# Patient Record
Sex: MALE | Race: WHITE | NOT HISPANIC OR LATINO | URBAN - METROPOLITAN AREA
[De-identification: names, ages, dates, MRNs, and addresses within clinical notes are randomized per-mention and may not be internally consistent; named-entity substitution may affect disease eponyms.]

---
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
## 2019-05-20 ENCOUNTER — INPATIENT (INPATIENT)
Facility: HOSPITAL | Age: 19
LOS: 7 days | Discharge: HOME | End: 2019-05-28
Attending: PSYCHIATRY & NEUROLOGY | Admitting: PSYCHIATRY & NEUROLOGY
Payer: MEDICAID

## 2019-05-20 ENCOUNTER — TRANSCRIPTION ENCOUNTER (OUTPATIENT)
Age: 19
End: 2019-05-20

## 2019-05-20 VITALS
SYSTOLIC BLOOD PRESSURE: 126 MMHG | TEMPERATURE: 98 F | OXYGEN SATURATION: 95 % | DIASTOLIC BLOOD PRESSURE: 87 MMHG | RESPIRATION RATE: 18 BRPM | WEIGHT: 139.99 LBS | HEART RATE: 110 BPM

## 2019-05-20 DIAGNOSIS — R45.851 SUICIDAL IDEATIONS: ICD-10-CM

## 2019-05-20 DIAGNOSIS — T39.311A POISONING BY PROPIONIC ACID DERIVATIVES, ACCIDENTAL (UNINTENTIONAL), INITIAL ENCOUNTER: ICD-10-CM

## 2019-05-20 DIAGNOSIS — F63.9 IMPULSE DISORDER, UNSPECIFIED: ICD-10-CM

## 2019-05-20 DIAGNOSIS — F60.9 PERSONALITY DISORDER, UNSPECIFIED: ICD-10-CM

## 2019-05-20 DIAGNOSIS — F39 UNSPECIFIED MOOD [AFFECTIVE] DISORDER: ICD-10-CM

## 2019-05-20 LAB
ALBUMIN SERPL ELPH-MCNC: 5.1 G/DL — SIGNIFICANT CHANGE UP (ref 3.5–5.2)
ALP SERPL-CCNC: 102 U/L — SIGNIFICANT CHANGE UP (ref 30–115)
ALT FLD-CCNC: 9 U/L — LOW (ref 13–38)
ANION GAP SERPL CALC-SCNC: 16 MMOL/L — HIGH (ref 7–14)
APAP SERPL-MCNC: <5 UG/ML — LOW (ref 10–30)
AST SERPL-CCNC: 14 U/L — SIGNIFICANT CHANGE UP (ref 13–38)
BASE EXCESS BLDV CALC-SCNC: 1 MMOL/L — SIGNIFICANT CHANGE UP (ref -2–2)
BASOPHILS # BLD AUTO: 0.02 K/UL — SIGNIFICANT CHANGE UP (ref 0–0.2)
BASOPHILS NFR BLD AUTO: 0.3 % — SIGNIFICANT CHANGE UP (ref 0–1)
BILIRUB SERPL-MCNC: 0.7 MG/DL — SIGNIFICANT CHANGE UP (ref 0.2–1.2)
BUN SERPL-MCNC: 17 MG/DL — SIGNIFICANT CHANGE UP (ref 10–20)
CA-I SERPL-SCNC: 1.24 MMOL/L — SIGNIFICANT CHANGE UP (ref 1.12–1.3)
CALCIUM SERPL-MCNC: 9.9 MG/DL — SIGNIFICANT CHANGE UP (ref 8.5–10.1)
CHLORIDE SERPL-SCNC: 102 MMOL/L — SIGNIFICANT CHANGE UP (ref 98–110)
CO2 SERPL-SCNC: 23 MMOL/L — SIGNIFICANT CHANGE UP (ref 17–32)
CREAT SERPL-MCNC: 1 MG/DL — SIGNIFICANT CHANGE UP (ref 0.7–1.5)
EOSINOPHIL # BLD AUTO: 0.05 K/UL — SIGNIFICANT CHANGE UP (ref 0–0.7)
EOSINOPHIL NFR BLD AUTO: 0.6 % — SIGNIFICANT CHANGE UP (ref 0–8)
ETHANOL SERPL-MCNC: <10 MG/DL — SIGNIFICANT CHANGE UP
GAS PNL BLDV: 139 MMOL/L — SIGNIFICANT CHANGE UP (ref 136–145)
GAS PNL BLDV: SIGNIFICANT CHANGE UP
GLUCOSE SERPL-MCNC: 102 MG/DL — HIGH (ref 70–99)
HCO3 BLDV-SCNC: 27 MMOL/L — SIGNIFICANT CHANGE UP (ref 22–29)
HCT VFR BLD CALC: 46.9 % — SIGNIFICANT CHANGE UP (ref 42–52)
HCT VFR BLDA CALC: 52.9 % — HIGH (ref 34–44)
HGB BLD CALC-MCNC: 17.3 G/DL — SIGNIFICANT CHANGE UP (ref 14–18)
HGB BLD-MCNC: 16.4 G/DL — SIGNIFICANT CHANGE UP (ref 14–18)
IMM GRANULOCYTES NFR BLD AUTO: 0.5 % — HIGH (ref 0.1–0.3)
LACTATE BLDV-MCNC: 1.2 MMOL/L — SIGNIFICANT CHANGE UP (ref 0.5–1.6)
LYMPHOCYTES # BLD AUTO: 1.13 K/UL — LOW (ref 1.2–3.4)
LYMPHOCYTES # BLD AUTO: 14.5 % — LOW (ref 20.5–51.1)
MCHC RBC-ENTMCNC: 29.4 PG — SIGNIFICANT CHANGE UP (ref 27–31)
MCHC RBC-ENTMCNC: 35 G/DL — SIGNIFICANT CHANGE UP (ref 32–37)
MCV RBC AUTO: 84.1 FL — SIGNIFICANT CHANGE UP (ref 80–94)
MONOCYTES # BLD AUTO: 0.5 K/UL — SIGNIFICANT CHANGE UP (ref 0.1–0.6)
MONOCYTES NFR BLD AUTO: 6.4 % — SIGNIFICANT CHANGE UP (ref 1.7–9.3)
NEUTROPHILS # BLD AUTO: 6.03 K/UL — SIGNIFICANT CHANGE UP (ref 1.4–6.5)
NEUTROPHILS NFR BLD AUTO: 77.7 % — HIGH (ref 42.2–75.2)
NRBC # BLD: 0 /100 WBCS — SIGNIFICANT CHANGE UP (ref 0–0)
PCO2 BLDV: 45 MMHG — SIGNIFICANT CHANGE UP (ref 41–51)
PH BLDV: 7.38 — SIGNIFICANT CHANGE UP (ref 7.26–7.43)
PLATELET # BLD AUTO: 216 K/UL — SIGNIFICANT CHANGE UP (ref 130–400)
PO2 BLDV: 40 MMHG — SIGNIFICANT CHANGE UP (ref 20–40)
POTASSIUM BLDV-SCNC: 4 MMOL/L — SIGNIFICANT CHANGE UP (ref 3.3–5.6)
POTASSIUM SERPL-MCNC: 4.2 MMOL/L — SIGNIFICANT CHANGE UP (ref 3.5–5)
POTASSIUM SERPL-SCNC: 4.2 MMOL/L — SIGNIFICANT CHANGE UP (ref 3.5–5)
PROT SERPL-MCNC: 7.5 G/DL — SIGNIFICANT CHANGE UP (ref 6–8)
RBC # BLD: 5.58 M/UL — SIGNIFICANT CHANGE UP (ref 4.7–6.1)
RBC # FLD: 11.7 % — SIGNIFICANT CHANGE UP (ref 11.5–14.5)
SALICYLATES SERPL-MCNC: <0.3 MG/DL — LOW (ref 4–30)
SAO2 % BLDV: 80 % — SIGNIFICANT CHANGE UP
SODIUM SERPL-SCNC: 141 MMOL/L — SIGNIFICANT CHANGE UP (ref 135–146)
WBC # BLD: 7.77 K/UL — SIGNIFICANT CHANGE UP (ref 4.8–10.8)
WBC # FLD AUTO: 7.77 K/UL — SIGNIFICANT CHANGE UP (ref 4.8–10.8)

## 2019-05-20 PROCEDURE — 90792 PSYCH DIAG EVAL W/MED SRVCS: CPT

## 2019-05-20 PROCEDURE — 99285 EMERGENCY DEPT VISIT HI MDM: CPT

## 2019-05-20 PROCEDURE — 93010 ELECTROCARDIOGRAM REPORT: CPT

## 2019-05-20 RX ORDER — HYDROXYZINE HCL 10 MG
50 TABLET ORAL EVERY 6 HOURS
Refills: 0 | Status: DISCONTINUED | OUTPATIENT
Start: 2019-05-20 | End: 2019-05-28

## 2019-05-20 RX ORDER — HALOPERIDOL DECANOATE 100 MG/ML
2.5 INJECTION INTRAMUSCULAR EVERY 6 HOURS
Refills: 0 | Status: DISCONTINUED | OUTPATIENT
Start: 2019-05-21 | End: 2019-05-28

## 2019-05-20 RX ORDER — HYDROXYZINE HCL 10 MG
50 TABLET ORAL EVERY 6 HOURS
Refills: 0 | Status: DISCONTINUED | OUTPATIENT
Start: 2019-05-21 | End: 2019-05-28

## 2019-05-20 RX ORDER — NICOTINE POLACRILEX 2 MG
2 GUM BUCCAL
Refills: 0 | Status: DISCONTINUED | OUTPATIENT
Start: 2019-05-20 | End: 2019-05-28

## 2019-05-20 RX ADMIN — Medication 2 MILLIGRAM(S): at 20:47

## 2019-05-20 NOTE — ED BEHAVIORAL HEALTH ASSESSMENT NOTE - HPI (INCLUDE ILLNESS QUALITY, SEVERITY, DURATION, TIMING, CONTEXT, MODIFYING FACTORS, ASSOCIATED SIGNS AND SYMPTOMS)
18 y o  male, domiciled in studio apartment with his wife and 4 month old daughter, unemployed x 1 month, with past medical history of resolved pediatric heart murmur and GERD, with psychiatric history of cannabis use disorder, no past inpatient psychiatric admissions, no past suicide attempts, with hx of self injurious behavior of cutting himself, brought in by ambulance, who were called by his wife, for suicide attempt.  Psychiatry consult was placed for same.     On approach, patient is lying in bed, with 1:1 at side.  He reports that this morning he woke up to his wife packing up her and their child's belongings with 2 police officers at her side, stating that they were leaving to live with her parents.  Patient reports subsequently going to the medicine cabinet and swallowing 30 Ibuprofen pills in one sitting. Does not report any rumination, intent or plan prior to doing this. Reports "at the moment, I was really angry and did it to die and also to get back at my wife and then I don't know if from the adrenaline or regret I decided to throw it all up." Patient reports that he took a video recording of the attempt and sent it to his wife saying "see, here I am, really doing this." Reports that immediately after sending the video, he self induced vomiting "so that it wouldn't work."  Patient reports that he is happy the attempt was unsuccessful and is motivated to stay alive to watch his daughter grow up.  Patient reports that there were no acute stressors with his wife yesterday, however, reports increasing stress in the last month as he lost his job and the bills have been more insurmountable.  However, reports no reason for wife to be accompanied by police in the AM.  He states that he newsome shave a history of "anger issues, punching walls and screaming at people at the drop of a hat," however, denies any physical aggression towards wife or child.  Reports that in the last month, his mood has been "depressed, where I want to be by myself and listen and write music and just isolate" and that he has feelings of "guilt and worthlessness."  Reports that he has no thoughts of hurting or killing himself or others.  Denies any changes in appetite, sleep, energy or concentration and reports that he still enjoys his hobbies of music and video games.  Denies any symptoms consistent with ephraim, anxiety or psychosis. 18 y o  male, domiciled in studio apartment with his wife and 4 month old daughter, unemployed x 1 month, with past medical history of resolved pediatric heart murmur and GERD, with psychiatric history of cannabis use disorder, no past inpatient psychiatric admissions, no past suicide attempts, with hx of self injurious behavior of cutting himself, brought in by ambulance, who were called by his wife, for suicide attempt.  Psychiatry consult was placed for same.     On approach, patient is lying in bed, with 1:1 at side.  He reports that this morning he woke up to his wife packing up her and their child's belongings with 2 police officers at her side, stating that they were leaving to live with her parents.  Patient reports subsequently going to the medicine cabinet and swallowing 30 Ibuprofen pills in one sitting. Does not report any rumination, intent or plan prior to doing this. Reports "at the moment, I was really angry and did it to die and also to get back at my wife and then I don't know if from the adrenaline or regret I decided to throw it all up." Patient reports that he took a video recording of the attempt and sent it to his wife saying "see, here I am, really doing this." Reports that immediately after sending the video, he self induced vomiting "so that it wouldn't work."  Patient reports that he is happy the attempt was unsuccessful and is motivated to stay alive to watch his daughter grow up.  Patient reports that there were no acute stressors with his wife yesterday, however, reports increasing stress in the last month as he lost his job and the bills have been more insurmountable.  However, reports no reason for wife to be accompanied by police in the AM.  He states that he newsome shave a history of "anger issues, punching walls and screaming at people at the drop of a hat," however, denies any physical aggression towards wife or child.  Reports that in the last month, his mood has been "depressed, where I want to be by myself and listen and write music and just isolate" and that he has feelings of "guilt and worthlessness."  Reports that he has no thoughts of hurting or killing himself or others.  Denies any changes in appetite, sleep, energy or concentration and reports that he still enjoys his hobbies of music and video games.  Denies any symptoms consistent with ephraim, anxiety or psychosis.  Patient aware that he will be transferred to inpatient psychiatry unit once medically cleared and refused medication treatment for his mood symptoms at this time.     Attempts to obtain personal collateral from wife were unsuccessful. 18 y o  male, domiciled in studio apartment with his wife and 4 month old daughter, unemployed x 1 month, with past medical history of resolved pediatric heart murmur and GERD, with psychiatric history of cannabis use disorder, no past inpatient psychiatric admissions, no past suicide attempts, with hx of self injurious behavior of cutting himself, brought in by ambulance, who were called by his wife, for suicide attempt.  Psychiatry consult was placed for same.     On approach, patient is lying in bed, with 1:1 at side.  He reports that this morning he woke up to his wife packing up her and their child's belongings with 2 police officers at her side, stating that they were leaving to live with her parents.  Patient reports subsequently going to the medicine cabinet and swallowing 30 Ibuprofen pills in one sitting. Does not report any rumination, intent or plan prior to doing this. Reports "at the moment, I was really angry and did it to die and also to get back at my wife and then I don't know if from the adrenaline or regret I decided to throw it all up." Patient reports that he took a video recording of the attempt and sent it to his wife saying "see, here I am, really doing this." Reports that immediately after sending the video, he self induced vomiting "so that it wouldn't work."  Patient reports that he is happy the attempt was unsuccessful and is motivated to stay alive to watch his daughter grow up.  Patient reports that there were no acute stressors with his wife yesterday, however, reports increasing stress in the last month as he lost his job and the bills have been more insurmountable.  However, reports no reason for wife to be accompanied by police in the AM.  He states that he does have a history of "anger issues, punching walls and screaming at people at the drop of a hat," however, denies any physical aggression towards wife or child.  Reports that in the last month, his mood has been "depressed, where I want to be by myself and listen and write music and just isolate" and that he has feelings of "guilt and worthlessness."  Reports that he currently has no thoughts of hurting or killing himself or others.  Denies any changes in appetite, sleep, energy or concentration and reports that he still enjoys his hobbies of music and video games.  Denies any symptoms consistent with ephraim, anxiety or psychosis.    Attempts to obtain personal collateral from wife were unsuccessful.

## 2019-05-20 NOTE — ED BEHAVIORAL HEALTH ASSESSMENT NOTE - SUMMARY
18 y o  male, domiciled in studio apartment with his wife and 4 month old daughter, unemployed x 1 month, with past medical history of resolved pediatric heart murmur and GERD, with psychiatric history of cannabis use disorder, no past inpatient psychiatric admissions, no past suicide attempts, with hx of self injurious behavior of cutting himself, brought in by ambulance, who were called by his wife, for suicide attempt.  Psychiatry consult was placed for same.     Patient presents with suicide attempt and reported history of depressed mood and demoralization for 1 month.  Though patient is not at the moment endorsing suicidal ideations/intent/plan, his current attempt, his history of poor impulse control and low frustration tolerance (marked by self injurious behavior and physical aggression towards property) put him at imminent risk of self harm and make him appropriate for emergency inpatient psychiatric hospitalization for monitoring, safety and stabilization. Patient was offered medication for above symptoms, however, he refused.  Given patient's reports of impulsivity, recurrent self-injurious behavior, difficulty controlling anger, and provocative nature of current suicide attempt, Borderline Personality Disorder diagnosis should be explored.     Recommendations:   -admit patient under 9.39 emergency legal status   -Atarax 50mg PO PRN q6h for insomnia/anxiety  -Haldol 2.5mg PO PRN q6h and Ativan 1mg PO PRN q6h for agitation not responsive to verbal redirection

## 2019-05-20 NOTE — H&P ADULT - NSHPPHYSICALEXAM_GEN_ALL_CORE
Vital Signs Last 24 Hrs  T(C): 37.2 (05-20-19 @ 19:50)  T(F): 99 (05-20-19 @ 19:50), Max: 99 (05-20-19 @ 19:50)  HR: 110 (05-20-19 @ 12:30) (110 - 110)  BP: 126/87 (05-20-19 @ 12:30)  BP(mean): --  RR: 18 (05-20-19 @ 19:50) (18 - 18)  SpO2: 95% (05-20-19 @ 12:30) (95% - 95%)  Wt(kg): --

## 2019-05-20 NOTE — ED PROVIDER NOTE - PHYSICAL EXAMINATION
CONSTITUTIONAL: Well-developed; well-nourished; in no acute distress.   SKIN: warm, dry  HEAD: Normocephalic; atraumatic.  EYES: PERRL, EOMI, no conjunctival erythema  ENT: No nasal discharge; airway clear.  NECK: Supple; non tender.  CARD: S1, S2 normal; no murmurs, gallops, or rubs. Regular rate and rhythm.   RESP: No wheezes, rales or rhonchi.  ABD: soft ntnd, no peritoneal signs.  EXT: Normal ROM.  No clubbing, cyanosis or edema.   LYMPH: No acute cervical adenopathy.  NEURO: Alert, oriented, grossly unremarkable  PSYCH: Cooperative, appropriate.

## 2019-05-20 NOTE — ED ADULT NURSE NOTE - OBJECTIVE STATEMENT
pt presents to er with EMS, sent a video to his wife swallowing approximately 30 ibuprofen tabs, pt states he was intentionally trying to hurt himself to get back at his wife for leaving him. pt denies previous suicidal ideation and states that he threw up the pills after he swallowed them. pt denies homicidal ideation, denies auditory or visual hallucinations. pt disrobbed belongings with security

## 2019-05-20 NOTE — ED BEHAVIORAL HEALTH ASSESSMENT NOTE - SUICIDE RISK FACTORS
Hopelessness/Mood episode/Highly impulsive behavior/Substance abuse/dependence/History of abuse/trauma

## 2019-05-20 NOTE — ED BEHAVIORAL HEALTH ASSESSMENT NOTE - DESCRIPTION
Patient is in good behavioral control, complaint with blood draws and EKG, calm and cooperative with interview. 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

## 2019-05-20 NOTE — ED BEHAVIORAL HEALTH ASSESSMENT NOTE - RISK ASSESSMENT
Patient has elevated risk for self harm given current suicide attempt, substance use disorder, depressed mood, and lack of social support system and lack of outpatient follow up. Risk factors are not mitigated by future orientation , residential stability, and ability to state reasons for living and patient is thus at imminent risk for self harm and require involuntary inpatient psychiatric admission.

## 2019-05-20 NOTE — ED ADULT TRIAGE NOTE - CHIEF COMPLAINT QUOTE
pt took 30 pills of Motrin today for suicidal attempt, pt reports depression over the last 2 weeks, no psych hx. pt states " I woke up with 2  in my face and my wife is leaving me, I don't want to live anymore". pt denies abd pain , stating "I forced myself to vomit but the medication was dissolved already". 1:1 observation initiated in triage

## 2019-05-20 NOTE — ED BEHAVIORAL HEALTH ASSESSMENT NOTE - DESCRIPTION (FIRST USE, LAST USE, QUANTITY, FREQUENCY, DURATION)
reports quitting 1 month ago, with prior use of 3packs per week for 2.5 years reports starting marijuana use at 15 y o currently using 1/8th/ week "about $80" with recent switch to "CBD and THC oil pen"

## 2019-05-20 NOTE — ED BEHAVIORAL HEALTH ASSESSMENT NOTE - HYGIENE
[facility]    [address]    [facility] [address]    Phone:  [phone]    Fax:  [phone]                                       Savanna Rehman   MRN: [id number]    Department:  [facility] Emergency Department   Date of Visit:  1/4/2018           After Visit Summary Signature Page     I have received my discharge instructions, and my questions have been answered. I have discussed any challenges I see with this plan with the nurse or doctor.    ..........................................................................................................................................  Patient/Patient Representative Signature      ..........................................................................................................................................  Patient Representative Print Name and Relationship to Patient    ..................................................               ................................................  Date                                            Time    ..........................................................................................................................................  Reviewed by Signature/Title    ...................................................              ..............................................  Date                                                            Time           Fair

## 2019-05-20 NOTE — ED BEHAVIORAL HEALTH ASSESSMENT NOTE - PSYCHIATRIC ISSUES AND PLAN (INCLUDE STANDING AND PRN MEDICATION)
Mood Disorder NOS: patient refusing medication treatment; Insomnia/Anxiety: Atarax 50mg PO PRN q6h; Agitation: Haldol 2.5mg PO PRN q6h and Ativan 1mg PO PRN q6h

## 2019-05-20 NOTE — ED BEHAVIORAL HEALTH ASSESSMENT NOTE - OTHER PAST PSYCHIATRIC HISTORY (INCLUDE DETAILS REGARDING ONSET, COURSE OF ILLNESS, INPATIENT/OUTPATIENT TREATMENT)
Denies past psychiatric hx, IPP admissions, medication trials, suicide attempts; reports hx of self injurious behavior of cutting self with steak knife when he gets into an argument with girlfriend, without suicidal intent; reports hx of family therapy 4-5 years with mom and siblings
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
## 2019-05-20 NOTE — ED BEHAVIORAL HEALTH ASSESSMENT NOTE - PATIENT'S CHIEF COMPLAINT
"Last night my wife and I were watching [employer] and then this morning I woke up and she was packing all her things with two policemen next to her"

## 2019-05-20 NOTE — ED BEHAVIORAL HEALTH ASSESSMENT NOTE - LEGAL HISTORY
reports receiving summons for stealing from [facility], [place] and for marijuana possession

## 2019-05-20 NOTE — ED PROVIDER NOTE - OBJECTIVE STATEMENT
18M with no significant pmh presents due to ibuprofen overdose. PT states that he came home and saw that his wife was packing her belongings with the police present. PT became upset and ingested 30 ibuprofen pills of unknown dosage, though PT states that pills were OTC. Confirms 1 incident of self induced vomiting, nbnb. Denies abd pain, current SI or HI, but confirms history of cutting without SI.

## 2019-05-20 NOTE — ED BEHAVIORAL HEALTH ASSESSMENT NOTE - CASE SUMMARY
Mr Ferro is a 18 year old man with no history of a psychiatric illness who presented to the ED following an overdose of 30 pills of Ibuprofen. Psychiatry consult was called for the evaluation of possible suicidal ideations.   His actions appear provocative however considering his depressed mood for the past 1 month, his multiple stressors, his history of self-mutilation, patient appears to have mood dysregulation, poor impulse control, poor coping skills and poor frustrations tolerance.  At this time, patient is considered a danger to himself or others and needs inpatient psychiatric hospitalization for medication management and symptoms stabilization upon medical and toxicology clearance. Patient was offered psychotropic medication for depression however patient refused. Patient will however be started on Haldol and Ativan on an as needed basis for agitation and Atarax on an as needed basis for anxiety and insomnia. Patient will benefit from continued monitoring of renal function.

## 2019-05-20 NOTE — ED PROVIDER NOTE - PROGRESS NOTE DETAILS
Spoke with Dr. Hernández who requests acetaminophen, salicylates, alcohol, and UDS as well as EKG. Spoke with Dr. Hernández, , who requests acetaminophen, salicylates, alcohol, and UDS as well as EKG and observation. Psychiatry consult placed. ATTENDING NOTE:   19 y/o M with PMH of self harm with cutting, no prior psychiatric or medical dx, presenting with suicidal attempt taking 30 tabs of 200mg ibuprofen after learning his girlfriend was leaving him. Now feeling more regretful and less agitated per pt. Exam: NAD, NCAT, HEENT: mmm, EOMI, PERRLA, Neck: supple, nontender, nl ROM, Heart: RRR,     no murmur, Extremities 2+ b/l pulses intact. Lungs: BCTA, no signs of increased WOB, Abd: NTND, no guarding or rebound, no hernia palpated, no CVAT. MSK: chest, back, and ext nontender, nl rom, no deformity. Neuro: A&Ox3, normal strength, nl sensation throughout, normal speech. A/P: Consult toxicology, concern for renal or GI side effects of overdose falling in to moderate category. Contact psych and reassess maintain on 1 to 1. I signed pt out to Dr. Vasquez, pending adult IPP bed.

## 2019-05-20 NOTE — H&P ADULT - NSHPLABSRESULTS_GEN_ALL_CORE
16.4   7.77  )-----------( 216      ( 20 May 2019 14:10 )             46.9       05-20    141  |  102  |  17  ----------------------------<  102<H>  4.2   |  23  |  1.0    Ca    9.9      20 May 2019 14:10    TPro  7.5  /  Alb  5.1  /  TBili  0.7  /  DBili  x   /  AST  14  /  ALT  9<L>  /  AlkPhos  102  05-20                      Lactate Trend            CAPILLARY BLOOD GLUCOSE

## 2019-05-20 NOTE — PATIENT PROFILE BEHAVIORAL HEALTH - NSBHTYPATTPT_PSY_A_CORE
patient stated he swallowed motrin pills and immediately forced himself to throw up/aborted/previous attempts

## 2019-05-21 PROCEDURE — 99232 SBSQ HOSP IP/OBS MODERATE 35: CPT

## 2019-05-21 RX ADMIN — Medication 2 MILLIGRAM(S): at 19:25

## 2019-05-21 NOTE — MEDICAL STUDENT PROGRESS NOTE(EDUCATION) - NS MD HP STUD ASPLAN ASSES FT
17 yo M with Past psychiatric hx of self harm w/o currently in hospital due to attempted suicide by ibunoprofen OD.

## 2019-05-21 NOTE — MEDICAL STUDENT PROGRESS NOTE(EDUCATION) - SUBJECTIVE AND OBJECTIVE BOX
HPI:  19 yo   M domiciled in a private housing, with past psychiatric history of self harm w/o SI, presented to ED after Ibunoprofen overdose.   Pt admits it was an attempted suicide after his wife of 1.5 years left him without notifying him, recorded the video of the attempt and then pulled trigger to vomit tablets after "feeling scared." States this is his first suicide attempt.      He is currently unemployed after his workplace ([employer]) got shut down for renovation, resulting in delay in rent payment and causing strain in their marriage recently.  Pt denies signs of depressions including depressed mood, lack of interest or change of appetite. Pt denies signs of ephraim or psychosis.   Pt denies somatic symptoms such as N/V.     Past Psych Hx:  Pt admits to history of self harm by scratching himself by a dollar tree knife, most recently 2 days ago.   He states he engages in these behavior in order to diverge his attention from his anger and anxiety.   He denies any past psychiatric diagnosis, medication or hospitalization.     PMHx:   No pertinent past medical history    Medication:  haloperidol     Tablet 2.5 milliGRAM(s) Oral every 6 hours PRN  hydrOXYzine hydrochloride 50 milliGRAM(s) Oral every 6 hours PRN  hydrOXYzine hydrochloride 50 milliGRAM(s) Oral every 6 hours PRN  LORazepam     Tablet 1 milliGRAM(s) Oral once PRN  LORazepam     Tablet 1 milliGRAM(s) Oral every 6 hours PRN  nicotine  Polacrilex Gum 2 milliGRAM(s) Oral every 2 hours PRN    Substance Hx:   Pt admits to longstanding usage of marijuana, most recently 1/2 wk ago.   Pt states it is to calm his nerves and he smokes as much as he can afford to.   He also admits to occasional usage of cigarettes and Juul as a substitute for marijuana.     Social:  Highest level of education: dropped out from high school in sophomore year  family hx: Sister diagnosed with bipolar disorder, father with "frequent mood swings"      Mental Status Exam:  Appearance: NAD, well developed, good eye contact, no abnormal movements  Speech: regular rate, rhythm, volume and tone  Mood: "sad" "angry" "confused" about his wife  Affect: euthymic, full range and congruent   Thought process: linear   Thought content: no sign of delusion or hallucination   Insight: good  Judgement: good  Cognition: AAOx3

## 2019-05-21 NOTE — MEDICAL STUDENT PROGRESS NOTE(EDUCATION) - NS MD HP STUD ASPLAN PLAN FT
-Suicide ideation / attempt  f/u with collateral (wife)     -ibunoprofen overdose  Pt asymptomatic   observation

## 2019-05-21 NOTE — PROGRESS NOTE BEHAVIORAL HEALTH - SUMMARY
18 y o  male, domiciled in studio apartment with his wife and 4 month old daughter, unemployed x 1 month, with past medical history of resolved pediatric heart murmur and GERD, with psychiatric history of cannabis use disorder, no past inpatient psychiatric admissions, no past suicide attempts, with hx of self injurious behavior of cutting himself, brought in by ambulance, who were called by his wife, for suicide attempt.  Psychiatry consult was placed for same.     Patient presents with suicide attempt and reported history of depressed mood and demoralization for 1 month.  Though patient is not at the moment endorsing current suicidal ideations/intent/plan, his recent attempt, his history of poor impulse control and low frustration tolerance (marked by self injurious behavior and physical aggression towards property) put him at imminent risk of self harm and make him appropriate for emergency inpatient psychiatric hospitalization for monitoring, safety and stabilization. Patient was offered medication for above symptoms, however, he refused.  Given patient's reports of impulsivity, recurrent self-injurious behavior, difficulty controlling anger, and provocative nature of current suicide attempt, Borderline Personality Disorder diagnosis should be explored.     Mood d/o vs BPD  -Atarax 50mg PO PRN q6h for insomnia/anxiety  -Haldol 2.5mg PO PRN q6h and Ativan 1mg PO PRN q6h for agitation not responsive to verbal redirection  -crisis survival skills, pt will likely benefit from DBT outpatient 18 y o  male, domiciled in studio apartment with his wife and 4 month old daughter, unemployed x 1 month, with past medical history of resolved pediatric heart murmur and GERD, with psychiatric history of cannabis use disorder, no past inpatient psychiatric admissions, no past suicide attempts, with hx of self injurious behavior of cutting himself, brought in by ambulance, who were called by his wife, for suicide attempt.  Psychiatry consult was placed for same.     Patient presents with suicide attempt and reported history of depressed mood and demoralization for 1 month.  Though patient is not at the moment endorsing current suicidal ideations/intent/plan, his recent attempt, his history of poor impulse control and low frustration tolerance (marked by self injurious behavior and physical aggression towards property) put him at imminent risk of self harm and make him appropriate for emergency inpatient psychiatric hospitalization for monitoring, safety and stabilization. Patient was offered medication for above symptoms, however, he refused.  Given patient's reports of impulsivity, recurrent self-injurious behavior, difficulty controlling anger, and provocative nature of current suicide attempt, Borderline Personality Disorder diagnosis should be explored.     Mood d/o vs BPD  -Pt stated that he did not want to by any medications  -Atarax 50mg PO PRN q6h for insomnia/anxiety  -Haldol 2.5mg PO PRN q6h and Ativan 1mg PO PRN q6h for agitation not responsive to verbal redirection  -crisis survival skills, pt will likely benefit from DBT outpatient

## 2019-05-21 NOTE — PROGRESS NOTE BEHAVIORAL HEALTH - NSBHFUPADDHPIFT_PSY_A_CORE
19yo male, , domiciled with wife & 4mo (who left home yesterday), unemployed x1mo receiving unemployment benefits, with no pph, +cannabis use, no prior suicide attempts or IPP admissions. Patient biba for suicide attempt following episode where he consumed up to 30 ibuprofen then self-induced vomiting immediately afterwards.  patient reports that he wanted to kill himself for a split second but then forced himself to vomit because he wanted to live. This contradicts with triage note, where pt reported that he did not want to live at presentation. Ingestion was recorded and sent to wife who subsequently called 911. Patient states that he has been depressed, but still enjoys listening to music and playing video games. He also enjoys smoking cannabis with friends, but has not had money to do that. Patient reports chronic difficulty concentrating, denying loss of energy, hopelessness, or difficulty maintaining sleep. he also denies symptoms of ephraim and psychosis.    Writer unable to reach patient's wife at number provided.

## 2019-05-21 NOTE — CHART NOTE - NSCHARTNOTEFT_GEN_A_CORE
Social Work Admit Note:    Patient is 18 years of age male who was admitted for evaluation after suicide attempt.  Prior to admission patient ingested thirty of Ibuprofen pills at one time.  At time of assessment in the emergency department patient informed he took a video of this event to send to his spouse.  After sending the video he induced vomiting to abort his suicide attempt.      In the community patient resides with his spouse and four month old daughter.  He does not report stressor with spouse.  Other stressors identified as the recent loss of his job.  His place of employment has closed for renovations and he expects to return to work in September when it reopens.  Financial stressors of not being able to pay bills and keep up with the rent of his apartment endorsed.  Patient has recently been cutting.  His arm has superficial cuts.  Current use of cannabis use.       will continue to meet with patient 1:1 and with treatment team daily.  Discharge plan is for referral for continued mental health treatment in outpatient setting.      Please refer to Social Work Psychosocial for additional information.

## 2019-05-21 NOTE — PROGRESS NOTE BEHAVIORAL HEALTH - NSBHADDHXSUBSTFT_PSY_A_CORE
daily cannabis use when he can afford. $80 worth/week. quit smoking 1mo ago. uses Juuls for nicotine and cbd oil now.

## 2019-05-21 NOTE — CONSULT NOTE ADULT - SUBJECTIVE AND OBJECTIVE BOX
MARIANA GARCIA  18y  Male      Patient is a 18y old  Male who presents with a chief complaint of 18 YEARS OLD MALE COME TO ER FOR EVALUATION  AFTER SUICIDAL ATTEMPT . (20 May 2019 20:20)    HPI:  18M with no significant pmh presents due to ibuprofen overdose. PT states that he came home and saw that his wife was packing her belongings with the police present. PT became upset and ingested 30 ibuprofen pills of unknown dosage, though PT states that pills were OTC. Confirms 1 incident of self induced vomiting, nbnb. Denies abd pain, current SI or HI, but confirms history of cutting without SI. (20 May 2019 20:20)    INTERVAL HPI/OVERNIGHT EVENTS:  HEALTH ISSUES - PROBLEM Dx:  Ibuprofen overdose: Ibuprofen overdose  Suicidal ideation: Suicidal ideation  Personality disorder, unspecified  Impulse control disorder in adult  Mood disorder        PAST MEDICAL & SURGICAL HISTORY:  No pertinent past medical history    FAMILY HISTORY:    haloperidol     Tablet 2.5 milliGRAM(s) Oral every 6 hours PRN  hydrOXYzine hydrochloride 50 milliGRAM(s) Oral every 6 hours PRN  hydrOXYzine hydrochloride 50 milliGRAM(s) Oral every 6 hours PRN  LORazepam     Tablet 1 milliGRAM(s) Oral once PRN  LORazepam     Tablet 1 milliGRAM(s) Oral every 6 hours PRN  nicotine  Polacrilex Gum 2 milliGRAM(s) Oral every 2 hours PRN      REVIEW OF SYSTEMS:  CONSTITUTIONAL: No fever, weight loss, or fatigue  EYES: No eye pain, visual disturbances, or discharge  ENMT:  No difficulty hearing, tinnitus, vertigo; No sinus or throat pain  NECK: No pain or stiffness  BREASTS: No pain, masses, or nipple discharge  RESPIRATORY: No cough, wheezing, chills or hemoptysis; No shortness of breath  CARDIOVASCULAR: No chest pain, palpitations, dizziness, or leg swelling  GASTROINTESTINAL: No abdominal or epigastric pain. No nausea, vomiting, or hematemesis; No diarrhea or constipation. No melena or hematochezia.  GENITOURINARY: No dysuria, frequency, hematuria, or incontinence  NEUROLOGICAL: No headaches, memory loss, loss of strength, numbness, or tremors  SKIN: No itching, burning, rashes, or lesions   LYMPH NODES: No enlarged glands  ENDOCRINE: No heat or cold intolerance; No hair loss  MUSCULOSKELETAL: No joint pain or swelling; No muscle, back, or extremity pain  PSYCHIATRIC: as per hpi and previous psych history  HEME/LYMPH: No easy bruising, or bleeding gums  ALLERY AND IMMUNOLOGIC: No hives or eczema    T(C): 36.2 (05-21-19 @ 05:56), Max: 37.2 (05-20-19 @ 19:50)  HR: 71 (05-21-19 @ 05:56) (71 - 110)  BP: 117/81 (05-21-19 @ 05:56) (117/81 - 126/87)  RR: 18 (05-21-19 @ 05:56) (18 - 18)  SpO2: 95% (05-20-19 @ 12:30) (95% - 95%)  Wt(kg): --Vital Signs Last 24 Hrs  T(C): 36.2 (21 May 2019 05:56), Max: 37.2 (20 May 2019 19:50)  T(F): 97.2 (21 May 2019 05:56), Max: 99 (20 May 2019 19:50)  HR: 71 (21 May 2019 05:56) (71 - 110)  BP: 117/81 (21 May 2019 05:56) (117/81 - 126/87)  BP(mean): --  RR: 18 (21 May 2019 05:56) (18 - 18)  SpO2: 95% (20 May 2019 12:30) (95% - 95%)    PHYSICAL EXAM:  GENERAL: NAD,well-developed  HEAD:  Atraumatic, Normocephalic  EYES: EOMI, PERRLA, conjunctiva and sclera clear  ENMT: No tonsillar erythema, exudates, or enlargement; Moist mucous membranes, Good dentition, No lesions  NECK: Supple, No JVD, Normal thyroid  CHEST/LUNG: Clear bs bilaterally; No rales, rhonchi, wheezing  HEART: Regular rate and rhythm; No murmurs, rubs, or gallops  ABDOMEN: Soft, Nontender, Nondistended; Bowel sounds present  EXTREMITIES:  2+ Peripheral Pulses, No clubbing, cyanosis, or edema  LYMPH: No lymphadenopathy noted  SKIN: No rashes or lesions  Neuro: alert  no focal deficits    Consultant(s) Notes Reviewed:  [x ] YES  [ ] NO  Care Discussed with Consultants/Other Providers [ x] YES  [ ] NO    LABS:                        16.4   7.77  )-----------( 216      ( 20 May 2019 14:10 )             46.9     05-20    141  |  102  |  17  ----------------------------<  102<H>  4.2   |  23  |  1.0    Ca    9.9      20 May 2019 14:10    TPro  7.5  /  Alb  5.1  /  TBili  0.7  /  DBili  x   /  AST  14  /  ALT  9<L>  /  AlkPhos  102  05-20        CAPILLARY BLOOD GLUCOSE                RADIOLOGY & ADDITIONAL TESTS:    Imaging Personally Reviewed:  [ ] YES  [ ] NO

## 2019-05-21 NOTE — PROGRESS NOTE BEHAVIORAL HEALTH - NSBHADDHXPSYSOCFT_PSY_A_CORE
Patient raised in [place], mom lives in [place]. dad . dad used corporal punishment. patient dropped out of school in 10th grade, held back 5th grade and 6th grade. was in [employer] for math and english.  x1.5yr, but has been with wife for 3 yrs, no h/o domestic violence. patient was working as [employer]  until it closed for renovation - expected to return when it reopens in september.

## 2019-05-21 NOTE — PROGRESS NOTE BEHAVIORAL HEALTH - NSBHFUPINTERVALHXFT_PSY_A_CORE
No acute events overnight. patient consuming meals. reports that he has no interest in initiating medications unless he has to, saying that he would prefer therapy. He continues to endorse feeling depressed with the fact that his wife left, reporting that he does not know why she left with their child. he denies any current thoughts of wanting to die or kill himself.

## 2019-05-22 DIAGNOSIS — F43.20 ADJUSTMENT DISORDER, UNSPECIFIED: ICD-10-CM

## 2019-05-22 LAB
CHOLEST SERPL-MCNC: 144 MG/DL — SIGNIFICANT CHANGE UP (ref 82–200)
ESTIMATED AVERAGE GLUCOSE: 97 MG/DL — SIGNIFICANT CHANGE UP (ref 68–114)
HBA1C BLD-MCNC: 5 % — SIGNIFICANT CHANGE UP (ref 4–5.6)
HDLC SERPL-MCNC: 32 MG/DL — LOW
LIPID PNL WITH DIRECT LDL SERPL: 104 MG/DL — SIGNIFICANT CHANGE UP (ref 4–129)
TOTAL CHOLESTEROL/HDL RATIO MEASUREMENT: 4.5 RATIO — SIGNIFICANT CHANGE UP (ref 4–5.5)
TRIGL SERPL-MCNC: 133 MG/DL — SIGNIFICANT CHANGE UP (ref 10–149)
TSH SERPL-MCNC: 1.14 UIU/ML — SIGNIFICANT CHANGE UP (ref 0.27–4.2)

## 2019-05-22 PROCEDURE — 99232 SBSQ HOSP IP/OBS MODERATE 35: CPT

## 2019-05-22 RX ORDER — LANOLIN ALCOHOL/MO/W.PET/CERES
3 CREAM (GRAM) TOPICAL AT BEDTIME
Refills: 0 | Status: DISCONTINUED | OUTPATIENT
Start: 2019-05-22 | End: 2019-05-28

## 2019-05-22 RX ADMIN — Medication 2 MILLIGRAM(S): at 11:52

## 2019-05-22 RX ADMIN — Medication 3 MILLIGRAM(S): at 20:22

## 2019-05-22 RX ADMIN — Medication 50 MILLIGRAM(S): at 20:23

## 2019-05-22 RX ADMIN — Medication 2 MILLIGRAM(S): at 10:04

## 2019-05-22 RX ADMIN — Medication 2 MILLIGRAM(S): at 08:00

## 2019-05-22 RX ADMIN — Medication 2 MILLIGRAM(S): at 15:21

## 2019-05-22 NOTE — PROGRESS NOTE BEHAVIORAL HEALTH - SUMMARY
18 y o  male, domiciled in studio apartment with his wife and 4 month old daughter, unemployed x 1 month, with past medical history of resolved pediatric heart murmur and GERD, with psychiatric history of cannabis use disorder, no past inpatient psychiatric admissions, no past suicide attempts, with hx of self injurious behavior of cutting himself, brought in by ambulance, who were called by his wife, for suicide attempt.  Psychiatry consult was placed for same.     Patient presents with suicide attempt and reported history of depressed mood and demoralization for 1 month.  Though patient is not at the moment endorsing current suicidal ideations/intent/plan, his recent attempt, his history of poor impulse control and low frustration tolerance (marked by self injurious behavior and physical aggression towards property) put him at imminent risk of self harm and make him appropriate for emergency inpatient psychiatric hospitalization for monitoring, safety and stabilization. Patient was offered medication for above symptoms, however, he refused.  Given patient's reports of impulsivity, recurrent self-injurious behavior, difficulty controlling anger, and provocative nature of current suicide attempt, Borderline Personality Disorder diagnosis should be explored.     Mood d/o vs BPD  -Pt stated that he did not want to by any medications  - Melatonin 3 mg qhs for insomnia  -Atarax 50mg PO PRN q6h for insomnia/anxiety  -Haldol 2.5mg PO PRN q6h and Ativan 1mg PO PRN q6h for agitation not responsive to verbal redirection  -crisis survival skills, pt will likely benefit from DBT outpatient

## 2019-05-22 NOTE — MEDICAL STUDENT PROGRESS NOTE(EDUCATION) - SUBJECTIVE AND OBJECTIVE BOX
HPI:  19 yo   M domiciled in a private housing with wife and daughter (4mo), with past psychiatric history of self harm w/o SI, presented to ED after Ibunoprofen overdose.   Pt admits it was an attempted suicide after his wife of 1.5 years left him without notifying him, recorded the video of the attempt and then pulled trigger to vomit tablets after "feeling scared."   Denies prior suicide attempt.      He is currently unemployed after his workplace ([employer]) got shut down for renovation, resulting in delay in rent payment and causing strain in their marriage recently.  Pt denies signs of depressions including depressed mood, lack of interest or change of appetite. Pt denies signs of ephraim or psychosis.   Pt denies somatic symptoms such as N/V.     This morning, Pt denies acute overnight events or changes. He complains of inability to sleep and requests for melatonin.   He admits to anxiety in regard to his housing situation.   He states he has not been going to group sessions due to his lack of sleep but is willing to attend to sessions today.   Pt denies current suicide or homicidal ideation.   Pt denies any withdrawal symptoms or side effects.       Past Psych Hx:  Pt admits to history of self harm by scratching himself by a dollar tree knife, most recently 2 days ago.   He states he engages in these behavior in order to diverge his attention from his anger and anxiety.   He denies any past psychiatric diagnosis, medication or hospitalization.     PMHx:   No pertinent past medical history    Medication:  haloperidol     Tablet 2.5 milliGRAM(s) Oral every 6 hours PRN  hydrOXYzine hydrochloride 50 milliGRAM(s) Oral every 6 hours PRN  hydrOXYzine hydrochloride 50 milliGRAM(s) Oral every 6 hours PRN  LORazepam     Tablet 1 milliGRAM(s) Oral once PRN  LORazepam     Tablet 1 milliGRAM(s) Oral every 6 hours PRN  nicotine  Polacrilex Gum 2 milliGRAM(s) Oral every 2 hours PRN    Substance Hx:   Pt admits to longstanding usage of marijuana, most recently 1/2 wk ago.   Pt states it is to calm his nerves and he smokes as much as he can afford to.   He also admits to occasional usage of cigarettes and Juul as a substitute for marijuana.     Social:  Highest level of education: dropped out from high school in sophomore year  family hx: Sister diagnosed with bipolar disorder, father with "frequent mood swings"      Mental Status Exam:  Appearance: NAD, well developed, good eye contact, no abnormal movements  Speech: regular rate, rhythm, volume and tone  Mood: "alright" "it's OK"  Affect: euthymic, full range and congruent   Thought process: linear   Thought content: no sign of delusion or hallucination   Insight: good  Judgement: good  Cognition: AAOx4

## 2019-05-22 NOTE — PROGRESS NOTE BEHAVIORAL HEALTH - NSBHFUPINTERVALHXFT_PSY_A_CORE
Pt was seen, evaluated, chart reviewed.  As per nursing staff, no events overnight. On evaluation, pt reports that he was catching up on sleep yesterday and missed the groups during the day. Reports that he is doing "ok" and is regretful of his overdose attempt. Remains adamant that it was not a suicide attempt and that he did not want to die. Pt continues to not want any medication, stating that he is "not depressed." States that he needs to get out of the hospital and figure out how to keep his housing. Endorsed good appetite. Is agreeable to take melatonin for sleep. Denied A/V hallucinations. Denied paranoia. Denied suicidal/homicidal ideation, intent or plan.    Collateral was obtained from pt's mother Emilie ([phone]), with pt's permission. As per mother, she reports that pt's wife has "always threatened to leave him." She reports that as per the wife, they went to sleep and then she took off the next day and was packing her belongings. She is not sure what the pt did to instigate this situation. States that the next thing was pt was threatening to hurt himself "in order to get her back." States that afterwards he sent her the video of him taking the pills and she sent the ambulance to his house. Mother states that "the was a plot for him to get her back." States that the pt has "never been suicidal" and has never done anything like this before. She does not think that pt was actually trying to harm himself, states it was "just to get his wife to come back." Continues to report that she does not think pt is suicidal.

## 2019-05-22 NOTE — MEDICAL STUDENT PROGRESS NOTE(EDUCATION) - NS MD HP STUD ASPLAN PLAN FT
-Mood disorder / Suicide ideation / attempt  f/u with collaterals (wife, mother, cousin, grandmother"   Pt still prefers medications over therapy thinking they would not be helpful.   Can benefit from group therapy.     -ibunoprofen overdose  Pt asymptomatic   observation

## 2019-05-22 NOTE — MEDICAL STUDENT PROGRESS NOTE(EDUCATION) - NS MD HP STUD ASPLAN ASSES FT
19 yo M with Past psychiatric hx of self harm w/o currently in hospital due to attempted suicide by ibunoprofen OD.

## 2019-05-23 DIAGNOSIS — F60.9 PERSONALITY DISORDER, UNSPECIFIED: ICD-10-CM

## 2019-05-23 PROCEDURE — 99232 SBSQ HOSP IP/OBS MODERATE 35: CPT

## 2019-05-23 RX ADMIN — Medication 3 MILLIGRAM(S): at 20:20

## 2019-05-23 RX ADMIN — Medication 2 MILLIGRAM(S): at 11:00

## 2019-05-23 RX ADMIN — Medication 2 MILLIGRAM(S): at 08:10

## 2019-05-23 NOTE — MEDICAL STUDENT PROGRESS NOTE(EDUCATION) - NS MD HP STUD ASPLAN PLAN FT
-Mood disorder / Suicide ideation / attempt  f/u with collaterals (wife, mother, cousin, grandmother"   Pt still prefers medications over therapy thinking they would not be helpful.   Can benefit from group therapy including crisis survival skill sessions.     -insomnia   reports improvement per pt  Melatonin 3 mg qhs  Atarax 50mg PO PRN q6h    -ibunoprofen overdose  Pt asymptomatic   observation

## 2019-05-23 NOTE — MEDICAL STUDENT PROGRESS NOTE(EDUCATION) - SUBJECTIVE AND OBJECTIVE BOX
HPI:  17 yo   M domiciled in a private housing with wife and daughter (4mo), with past psychiatric history of self harm w/o SI, presented to ED after Ibunoprofen overdose.   Pt admits it was an attempted suicide after his wife of 1.5 years left him without notifying him, recorded the video of the attempt and then pulled trigger to vomit tablets after "feeling scared."   Denies prior suicide attempt.      He is currently unemployed after his workplace ([employer]) got shut down for renovation, resulting in delay in rent payment and causing strain in their marriage recently.  Pt denies signs of depressions including depressed mood, lack of interest or change of appetite. Pt denies signs of ephraim or psychosis.   Pt denies somatic symptoms such as N/V or headache.     This morning, Pt denies acute overnight events or changes.   He states his sleep improved compared to previous days and reports improved mood because of that.   Pt denies current suicide or homicidal ideation.   Pt denies any withdrawal symptoms or side effects.   He states he was able to attend a group session about addiction and found it very beneficial.   During group meeting with the team, he is educated of importance of stress management skill and risk associated with self harming behaviors. He is willing to attend crisis survival skill session today.  Pt states he had a 2 minute conversation with his wife over the phone yesterday, in which his wife stated "I'm sorry" according to Pt.       Past Psych Hx:  Pt admits to history of self harm by scratching himself by a dollar tree knife, most recently 2 days ago.   He states he engages in these behavior in order to diverge his attention from his anger and anxiety.   He denies any past psychiatric diagnosis, medication or hospitalization.     PMHx:   No pertinent past medical history    Medication:  haloperidol     Tablet 2.5 milliGRAM(s) Oral every 6 hours PRN  hydrOXYzine hydrochloride 50 milliGRAM(s) Oral every 6 hours PRN  hydrOXYzine hydrochloride 50 milliGRAM(s) Oral every 6 hours PRN  LORazepam     Tablet 1 milliGRAM(s) Oral once PRN  LORazepam     Tablet 1 milliGRAM(s) Oral every 6 hours PRN  nicotine  Polacrilex Gum 2 milliGRAM(s) Oral every 2 hours PRN    Substance Hx:   Pt admits to longstanding usage of marijuana, most recently 1/2 wk ago.   Pt states it is to calm his nerves and he smokes as much as he can afford to.   He also admits to occasional usage of cigarettes and Juul as a substitute for marijuana.     Social:  Highest level of education: dropped out from high school in sophomore year  family hx: Sister diagnosed with bipolar disorder, father with "frequent mood swings"      Mental Status Exam:  Appearance: NAD, well developed, good eye contact, no abnormal movements  Speech: regular rate, rhythm, volume and tone  Mood: "alright" "it's OK"  Affect: euthymic, full range and congruent   Thought process: linear   Thought content: no sign of delusion or hallucination   Insight: good  Judgement: good  Cognition: AAOx4

## 2019-05-23 NOTE — PROGRESS NOTE BEHAVIORAL HEALTH - NSBHCHARTREVIEWLAB_PSY_A_CORE FT
Lipid Profile (05.22.19 @ 09:15)    Total Cholesterol/HDL Ratio Measurement: 4.5 Ratio    Cholesterol, Serum: 144 mg/dL    Triglycerides, Serum: 133 mg/dL    HDL Cholesterol, Serum: 32: HDL Levels >/= 60 mg/dL are considered beneficial and a "negative" risk  factor.  Effective 08/15/2018: New reference range and interpretive comment. mg/dL    Direct LDL: 104: LDL Cholesterol (mg/dL) --- Interpretive Comment (for adults 18 and over)  Optimal LDL Level may vary based on clinical situation  Below 70                   Ideal for people at very high risk of heart  disease  Below 100                  Ideal for people at risk of heart disease  100 - 129                    [place]  130 - 159                    Borderline high  160 - 189                    High  190 and Above           Very high mg/dL      Thyroid Stimulating Hormone, Serum in AM (05.22.19 @ 09:15)    Thyroid Stimulating Hormone, Serum: 1.14 uIU/mL    Hemoglobin A1C with Mean Plasma Glucose (05.22.19 @ 09:15)    Hemoglobin A1C, Whole Blood: 5.0: Method: Immunoassay       Reference Range                4.0-5.6%       High risk (prediabetic)        5.7-6.4%       Diabetic, diagnostic             >=6.5%       ADA diabetic treatment goal       <7.0%  The Hemoglobin A1c testing is NGSP-certified.Reference ranges are based  upon the 2010 recommendations of  the American Diabetes Association.  Interpretation may vary for children  and adolescents. %    Mean Plasma Glucose: 97 mg/dL

## 2019-05-23 NOTE — PROGRESS NOTE BEHAVIORAL HEALTH - SUMMARY
18 y o  male, domiciled in studio apartment with his wife and 4 month old daughter, unemployed x 1 month, with past medical history of resolved pediatric heart murmur and GERD, with psychiatric history of cannabis use disorder, no past inpatient psychiatric admissions, no past suicide attempts, with hx of self injurious behavior of cutting himself, brought in by ambulance, who were called by his wife, for suicide attempt.  Psychiatry consult was placed for same.     Patient presents with suicide attempt and reported history of depressed mood and demoralization for 1 month.  Though patient is not at the moment endorsing current suicidal ideations/intent/plan, his recent attempt, his history of poor impulse control and low frustration tolerance (marked by self injurious behavior and physical aggression towards property) put him at imminent risk of self harm and make him appropriate for emergency inpatient psychiatric hospitalization for monitoring, safety and stabilization. Patient was offered medication for above symptoms, however, he refused.  Patient's reports of impulsivity, recurrent self-injurious behavior, difficulty controlling anger, and provocative nature of current suicide attempt are consistent with Borderline Personality Disorder diagnosis.    Mood d/o vs BPD  -Pt stated that he did not want to by any medications  - Melatonin 3 mg qhs for insomnia  -Atarax 50mg PO PRN q6h for insomnia/anxiety  -Haldol 2.5mg PO PRN q6h and Ativan 1mg PO PRN q6h for agitation not responsive to verbal redirection  -crisis survival skills, pt will likely benefit from DBT outpatient

## 2019-05-23 NOTE — CHART NOTE - NSCHARTNOTEFT_GEN_A_CORE
Social Work Note:    Treatment team met with patient to discuss treatment plan, medications and discharge plan.  During the day patient is observed to be visible on the unit.  He is encouraged to attend and actively participate in groups that are offered on the unit.  During team meeting patient was calm, cooperative and appropriate.  Patient endorsed communication with his spouse via telephone.  She was not able to visit during visiting hours.  Sunil was encouraged to attend this afternoon crisis skills group.      No barriers to discharge identified at this time. Plan is for referral to [facility].  Patient is aware and agreeable to same.     At this time patient is not psychiatrically stable for discharge.

## 2019-05-24 PROCEDURE — 99231 SBSQ HOSP IP/OBS SF/LOW 25: CPT

## 2019-05-24 RX ADMIN — Medication 3 MILLIGRAM(S): at 22:47

## 2019-05-24 RX ADMIN — Medication 2 MILLIGRAM(S): at 08:46

## 2019-05-24 RX ADMIN — Medication 2 MILLIGRAM(S): at 19:00

## 2019-05-24 NOTE — DISCHARGE NOTE BEHAVIORAL HEALTH - FAMILY HISTORY OF PSYCHIATRIC ILLNESS
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

## 2019-05-24 NOTE — DISCHARGE NOTE BEHAVIORAL HEALTH - NSBHDCSUBSTHXFT_PSY_A_CORE
reports starting marijuana use at 15 y o currently using 1/8th/ week "about $80" with recent switch to "CBD and THC oil pen"

## 2019-05-24 NOTE — PROGRESS NOTE BEHAVIORAL HEALTH - NSBHFUPINTERVALHXFT_PSY_A_CORE
Patient seen and evaluated. No acute events overnight. Engaging with peers, visible on unit. He endorse improved sleep with melatonin last night. He denies any thoughts of wanting to die or kill himself. Reports that he enjoyed meditation ysterday and believes that he will benefit from it going forward. He states that his wife has not come by and he hasn't spoken with her, continuing to endorse interest in providing her with a letter that he wrote. He states that there is nothing vulgar in the note and he has no idea how she may respond to it but reports that he wants his family back. Patient seen and evaluated. No acute events overnight. Engaging with peers, visible on unit. He endorse improved sleep with melatonin last night. He denies any thoughts of wanting to die or kill himself. Reports that he enjoyed meditation ysterday and believes that he will benefit from it going forward. He states that his wife has not come by and he hasn't spoken with her, continuing to endorse interest in providing her with a letter that he wrote. He states that there is nothing vulgar in the note and he has no idea how she may respond to it but reports that he wants his family back.    Writer spoke with patient's wife who presented to visit today. She reports that patient has recently threatened to kill himself if she left, with him pretending take pills last week. She reports leaving unannounced this week because their lease is up June1st and patient refused job offer and has not worked for 2 weeks and has refused to get his GED, so she felt she had to move out to care for their 3mo baby. She reports patietn gave her a letter today and asked that she not read it until she left. Patient has not discussed his next steps with her. Patient seen and evaluated. No acute events overnight. Engaging with peers, visible on unit. He endorse improved sleep with melatonin last night. He denies any thoughts of wanting to die or kill himself. Reports that he enjoyed meditation ysterday and believes that he will benefit from it going forward. He states that his wife has not come by and he hasn't spoken with her, continuing to endorse interest in providing her with a letter that he wrote. He states that there is nothing vulgar in the note and he has no idea how she may respond to it but reports that he wants his family back.    Writer spoke with patient's wife who presented to visit today. She reports that patient has recently threatened to kill himself if she left, with him pretending take pills last week. She reports leaving unannounced this week because their lease is up June1st and patient refused job offer and has not worked for 2 weeks and has refused to get his GED, so she felt she had to move out to care for their 3mo baby. She reports patient gave her a letter today and asked that she not read it until she left. Patient has not discussed his next steps with her.

## 2019-05-24 NOTE — DISCHARGE NOTE BEHAVIORAL HEALTH - NSBHDCMEDSFT_PSY_A_CORE
Pt reported that he did not want to be on any psychotropic medications. Stated that he was not feeling depressed, denied hopelessness, decreased energy. Endorsed having fair sleep and appetite. Denied manic symptoms. Denied paranoia. Denied A/V hallucinations. Denied suicidal/homicidal ideation, intent or plan. Pt was only agreeable to be on melatonin or Vistaril for sleep. Tolerated that well. Pt continued to adamantly deny this being a suicide attempt. Pt stated that he took the pills, to tape it and send it to his wife and then vomited the pills out. Reported that it was an impulse act and he did not want to die.

## 2019-05-24 NOTE — DISCHARGE NOTE BEHAVIORAL HEALTH - NSBHDCSUICFCTRSFT_PSY_A_CORE
no outpatient care, limited insight, interpersonal disagreement, unstable housing, unemployment, substance use

## 2019-05-24 NOTE — DISCHARGE NOTE BEHAVIORAL HEALTH - NSBHDCSWCOMMENTSFT_PSY_A_CORE
Discharge summary provided to the next level of care on this date, 5/28/19 to [phone] at 3:30 pm

## 2019-05-24 NOTE — PROGRESS NOTE BEHAVIORAL HEALTH - NSBHFUPINTERVALCCFT_PSY_A_CORE
"I don't know why she left"
"I'm OK"
"I want her to read my letter"
"I want her to read what I wrote"

## 2019-05-24 NOTE — PROGRESS NOTE BEHAVIORAL HEALTH - RISK ASSESSMENT
Patient has elevated risk for self harm given recent suicide attempt, substance use disorder, depressed mood, and lack of social support system and lack of outpatient follow up. Risk factors are not mitigated by future orientation , residential stability, and ability to state reasons for living and patient is thus at imminent risk for self harm and require involuntary inpatient psychiatric admission.

## 2019-05-24 NOTE — DISCHARGE NOTE BEHAVIORAL HEALTH - NSBHDCSUICPROTECTFT_PSY_A_CORE
infant daughter, , no suicidal ideation, engagement in treatment plan, no access to weapons, future-oriented

## 2019-05-24 NOTE — DISCHARGE NOTE BEHAVIORAL HEALTH - HPI (INCLUDE ILLNESS QUALITY, SEVERITY, DURATION, TIMING, CONTEXT, MODIFYING FACTORS, ASSOCIATED SIGNS AND SYMPTOMS)
18 y o  male, domiciled in studio apartment with his wife and 4 month old daughter, unemployed x 1 month, with past medical history of resolved pediatric heart murmur and GERD, with psychiatric history of cannabis use disorder, no past inpatient psychiatric admissions, no past suicide attempts, with hx of self injurious behavior of cutting himself, brought in by ambulance, who were called by his wife, for suicide attempt.  Psychiatry consult was placed for same.     On approach, patient is lying in bed, with 1:1 at side.  He reports that this morning he woke up to his wife packing up her and their child's belongings with 2 police officers at her side, stating that they were leaving to live with her parents.  Patient reports subsequently going to the medicine cabinet and swallowing 30 Ibuprofen pills in one sitting. Does not report any rumination, intent or plan prior to doing this. Reports "at the moment, I was really angry and did it to die and also to get back at my wife and then I don't know if from the adrenaline or regret I decided to throw it all up." Patient reports that he took a video recording of the attempt and sent it to his wife saying "see, here I am, really doing this." Reports that immediately after sending the video, he self induced vomiting "so that it wouldn't work."  Patient reports that he is happy the attempt was unsuccessful and is motivated to stay alive to watch his daughter grow up.  Patient reports that there were no acute stressors with his wife yesterday, however, reports increasing stress in the last month as he lost his job and the bills have been more insurmountable.  However, reports no reason for wife to be accompanied by police in the AM.  He states that he does have a history of "anger issues, punching walls and screaming at people at the drop of a hat," however, denies any physical aggression towards wife or child.  Reports that in the last month, his mood has been "depressed, where I want to be by myself and listen and write music and just isolate" and that he has feelings of "guilt and worthlessness."  Reports that he currently has no thoughts of hurting or killing himself or others.  Denies any changes in appetite, sleep, energy or concentration and reports that he still enjoys his hobbies of music and video games.  Denies any symptoms consistent with ephraim, anxiety or psychosis.

## 2019-05-24 NOTE — PROGRESS NOTE BEHAVIORAL HEALTH - SUMMARY
18 y o  male, domiciled in studio apartment with his wife and 4 month old daughter, unemployed x 1 month, with past medical history of resolved pediatric heart murmur and GERD, with psychiatric history of cannabis use disorder, no past inpatient psychiatric admissions, no past suicide attempts, with hx of self injurious behavior of cutting himself, brought in by ambulance, who were called by his wife, for suicide attempt.  Psychiatry consult was placed for same.     Patient presents with suicide attempt and reported history of depressed mood and demoralization for 1 month.  Though patient is not at the moment endorsing current suicidal ideations/intent/plan, his recent attempt, his history of poor impulse control and low frustration tolerance (marked by self injurious behavior and physical aggression towards property) put him at imminent risk of self harm and make him appropriate for emergency inpatient psychiatric hospitalization for monitoring, safety and stabilization. Patient was offered medication for above symptoms, however, he refused.  Patient's reports of impulsivity, recurrent self-injurious behavior, difficulty controlling anger, and provocative nature of current suicide attempt are consistent with Borderline Personality Disorder diagnosis.    Mood d/o vs BPD  -Pt stated that he did not want to by any medications  - Melatonin 3 mg qhs for insomnia  -Atarax 50mg PO PRN q6h for insomnia/anxiety  -Haldol 2.5mg PO PRN q6h and Ativan 1mg PO PRN q6h for agitation not responsive to verbal redirection  -crisis survival skills, pt will likely benefit from DBT outpatient 18 y o  male, domiciled in studio apartment with his wife and 4 month old daughter, unemployed x 1 month, with past medical history of resolved pediatric heart murmur and GERD, with psychiatric history of cannabis use disorder, no past inpatient psychiatric admissions, no past suicide attempts, with hx of self injurious behavior of cutting himself, brought in by ambulance, who were called by his wife, for suicide attempt.  Psychiatry consult was placed for same.     Patient presents s/p suicide attempt and reported history of depressed mood and demoralization for 1 month.  Though patient is not at the moment endorsing current suicidal ideations/intent/plan, his recent attempt, his history of poor impulse control and low frustration tolerance (marked by self injurious behavior and physical aggression towards property) put him at imminent risk of self harm and make him appropriate for emergency inpatient psychiatric hospitalization for monitoring, safety and stabilization. Patient was offered medication for above symptoms, however, he refused.  Patient's reports of impulsivity, recurrent self-injurious behavior, difficulty controlling anger, and provocative nature of current suicide attempt are consistent with Borderline Personality Disorder diagnosis.    Mood d/o vs BPD  -Pt stated that he did not want to by any medications  - Melatonin 3 mg qhs for insomnia  -Atarax 50mg PO PRN q6h for insomnia/anxiety  -Haldol 2.5mg PO PRN q6h and Ativan 1mg PO PRN q6h for agitation not responsive to verbal redirection  -crisis survival skills, pt will likely benefit from DBT outpatient

## 2019-05-24 NOTE — PROGRESS NOTE BEHAVIORAL HEALTH - PRIMARY DX
Cluster B personality disorder in adult
Cluster B personality disorder in adult
Mood disorder
Adjustment disorder, unspecified type

## 2019-05-24 NOTE — DISCHARGE NOTE BEHAVIORAL HEALTH - PROVIDER TOKENS
FREE:[LAST:[Ruth],FIRST:[Kalie],PHONE:[phone]],FAX:[phone]],ADDRESS:[[place] [address]  tel: [phone]  fax: [phone]]]

## 2019-05-24 NOTE — PROGRESS NOTE BEHAVIORAL HEALTH - SECONDARY DX1
Impulse control disorder in adult

## 2019-05-24 NOTE — DISCHARGE NOTE BEHAVIORAL HEALTH - NSBHDCRESPONSEFT_PSY_A_CORE
Patient responded well, he was engaged with peers, attended groups, and had visitations. He denies any suicidal ideation or thoughts of wanting to harm himself, endorsing multiple effect coping strategies (writing lyrics, listening to music) and plans to continue with outpatient management. He has been able to visit with his wife and denies any thoughts of wanting to harm anyone. He also denies any auditory hallucinations or feeling threatened.

## 2019-05-24 NOTE — DISCHARGE NOTE BEHAVIORAL HEALTH - CARE PROVIDER_API CALL
Kalie Ruth  [facility]   Dual Focus Program  [address] [place] [address]  tel: [phone]  fax: [phone]  Phone: [phone]  Fax: [phone]  Follow Up Time:

## 2019-05-24 NOTE — PROGRESS NOTE BEHAVIORAL HEALTH - CASE SUMMARY
Pt was seen and discussed with the resident, Dr. Barber. Chart reviewed. Agree with assessment and plan above. Continue with medications as above.
Pt was seen and discussed with the resident, Dr. Barber. Chart reviewed. Agree with assessment and plan above. On evaluation, reports that he is doing "ok." Offered no new complaints. Continues to report that he was filled with "instant regret" when he took the pills and threw them up. Denied any suicidal/homicidal ideation, intent or plan. Continue with medications as above.
Pt was seen and discussed with the resident, Dr. Barber. Chart reviewed. Agree with assessment and plan above.  Continue with medications as above.

## 2019-05-24 NOTE — PROGRESS NOTE BEHAVIORAL HEALTH - NSBHCHARTREVIEWVS_PSY_A_CORE FT
Vital Signs Last 24 Hrs  T(C): 35.8 (21 May 2019 10:26), Max: 37.2 (20 May 2019 19:50)  T(F): 96.4 (21 May 2019 10:26), Max: 99 (20 May 2019 19:50)  HR: 86 (21 May 2019 10:26) (71 - 86)  BP: 131/94 (21 May 2019 10:26) (117/81 - 131/94)  BP(mean): --  RR: 18 (21 May 2019 10:26) (18 - 18)  SpO2: --
ICU Vital Signs Last 24 Hrs  T(C): 36.3 (22 May 2019 09:51), Max: 36.3 (22 May 2019 09:51)  T(F): 97.4 (22 May 2019 09:51), Max: 97.4 (22 May 2019 09:51)  HR: 95 (22 May 2019 09:51) (75 - 95)  BP: 127/74 (22 May 2019 09:51) (120/86 - 127/78)  BP(mean): --  ABP: --  ABP(mean): --  RR: 20 (22 May 2019 09:51) (16 - 20)  SpO2: --
Vital Signs Last 24 Hrs  T(C): 36.9 (24 May 2019 10:56), Max: 36.9 (24 May 2019 10:56)  T(F): 98.4 (24 May 2019 10:56), Max: 98.4 (24 May 2019 10:56)  HR: 111 (24 May 2019 10:56) (78 - 111)  BP: 122/80 (24 May 2019 10:56) (114/72 - 124/80)  BP(mean): --  RR: 18 (24 May 2019 10:56) (16 - 18)  SpO2: --
Vital Signs Last 24 Hrs  T(C): 36.8 (23 May 2019 10:36), Max: 36.8 (23 May 2019 10:36)  T(F): 98.2 (23 May 2019 10:36), Max: 98.2 (23 May 2019 10:36)  HR: 98 (23 May 2019 10:36) (69 - 98)  BP: 132/97 (23 May 2019 10:36) (122/62 - 132/97)  BP(mean): --  RR: 18 (23 May 2019 10:36) (18 - 19)  SpO2: --

## 2019-05-25 RX ADMIN — Medication 2 MILLIGRAM(S): at 07:56

## 2019-05-25 RX ADMIN — Medication 2 MILLIGRAM(S): at 11:43

## 2019-05-25 RX ADMIN — Medication 2 MILLIGRAM(S): at 17:38

## 2019-05-25 RX ADMIN — Medication 3 MILLIGRAM(S): at 20:22

## 2019-05-26 RX ADMIN — Medication 2 MILLIGRAM(S): at 08:24

## 2019-05-26 RX ADMIN — Medication 2 MILLIGRAM(S): at 17:00

## 2019-05-26 RX ADMIN — Medication 2 MILLIGRAM(S): at 13:07

## 2019-05-26 RX ADMIN — Medication 2 MILLIGRAM(S): at 10:58

## 2019-05-26 RX ADMIN — Medication 3 MILLIGRAM(S): at 21:39

## 2019-05-27 RX ADMIN — Medication 2 MILLIGRAM(S): at 10:59

## 2019-05-27 RX ADMIN — Medication 2 MILLIGRAM(S): at 12:35

## 2019-05-27 RX ADMIN — Medication 2 MILLIGRAM(S): at 18:24

## 2019-05-28 VITALS
DIASTOLIC BLOOD PRESSURE: 77 MMHG | HEART RATE: 95 BPM | TEMPERATURE: 96 F | RESPIRATION RATE: 18 BRPM | SYSTOLIC BLOOD PRESSURE: 118 MMHG

## 2019-05-28 PROCEDURE — 99238 HOSP IP/OBS DSCHRG MGMT 30/<: CPT

## 2019-05-28 NOTE — PROGRESS NOTE ADULT - SUBJECTIVE AND OBJECTIVE BOX
pt stable alert in NAD  no new complaints    SUICIDAL IDEATION; IBUPROFEN OVERDOSE  ^EXCESSIVE USE OF MEDS  Handoff  MEWS Score  No pertinent past medical history  Adjustment disorder, unspecified type  Suicidal ideation  Cluster B personality disorder in adult  Adjustment disorder, unspecified type  Ibuprofen overdose  Suicidal ideation  Personality disorder, unspecified  Impulse control disorder in adult  Mood disorder  EXCESSIVE USE OF MEDS  Cannabis abuse  Cluster B personality disorder in adult  Ibuprofen overdose    HEALTH ISSUES - PROBLEM Dx:  Cluster B personality disorder in adult  Adjustment disorder, unspecified type  Ibuprofen overdose: Ibuprofen overdose  Suicidal ideation: Suicidal ideation  Personality disorder, unspecified  Impulse control disorder in adult  Mood disorder        PAST MEDICAL & SURGICAL HISTORY:  No pertinent past medical history    No Known Allergies      FAMILY HISTORY:      haloperidol     Tablet 2.5 milliGRAM(s) Oral every 6 hours PRN  hydrOXYzine hydrochloride 50 milliGRAM(s) Oral every 6 hours PRN  hydrOXYzine hydrochloride 50 milliGRAM(s) Oral every 6 hours PRN  LORazepam     Tablet 1 milliGRAM(s) Oral every 6 hours PRN  melatonin. 3 milliGRAM(s) Oral at bedtime  nicotine  Polacrilex Gum 2 milliGRAM(s) Oral every 2 hours PRN      T(C): 37.1 (05-28-19 @ 06:20), Max: 37.1 (05-28-19 @ 06:20)  HR: 85 (05-28-19 @ 06:20) (85 - 86)  BP: 102/88 (05-28-19 @ 06:20) (102/88 - 137/86)  RR: 18 (05-28-19 @ 06:20) (17 - 18)  SpO2: --    PE;  general:  no acute cahnges in nad    Lungs:    Heart:    EXT:    Neuro:  alert no defciits                          CAPILLARY BLOOD GLUCOSE
pt stable alert in NAD  no new complaints    SUICIDAL IDEATION; IBUPROFEN OVERDOSE  ^EXCESSIVE USE OF MEDS  MEWS Score  No pertinent past medical history  Suicidal ideation  Ibuprofen overdose  Suicidal ideation  Personality disorder, unspecified  Impulse control disorder in adult  Mood disorder  EXCESSIVE USE OF MEDS  Ibuprofen overdose    HEALTH ISSUES - PROBLEM Dx:  Ibuprofen overdose: Ibuprofen overdose  Suicidal ideation: Suicidal ideation  Personality disorder, unspecified  Impulse control disorder in adult  Mood disorder        PAST MEDICAL & SURGICAL HISTORY:  No pertinent past medical history    No Known Allergies      FAMILY HISTORY:      haloperidol     Tablet 2.5 milliGRAM(s) Oral every 6 hours PRN  hydrOXYzine hydrochloride 50 milliGRAM(s) Oral every 6 hours PRN  hydrOXYzine hydrochloride 50 milliGRAM(s) Oral every 6 hours PRN  LORazepam     Tablet 1 milliGRAM(s) Oral once PRN  LORazepam     Tablet 1 milliGRAM(s) Oral every 6 hours PRN  nicotine  Polacrilex Gum 2 milliGRAM(s) Oral every 2 hours PRN      T(C): 36.1 (05-22-19 @ 07:13), Max: 36.1 (05-22-19 @ 07:13)  HR: 87 (05-22-19 @ 07:13) (75 - 87)  BP: 127/78 (05-22-19 @ 07:13) (120/86 - 131/94)  RR: 16 (05-22-19 @ 07:13) (16 - 18)  SpO2: --    PE;  general:  stable on changes from previous    Lungs:    Heart:    EXT:    Neuro:aelrt      16.4  46.9  7.77  17  1.0  4.2  102      05-20    141  |  102  |  17  ----------------------------<  102<H>  4.2   |  23  |  1.0    Ca    9.9      20 May 2019 14:10    TPro  7.5  /  Alb  5.1  /  TBili  0.7  /  DBili  x   /  AST  14  /  ALT  9<L>  /  AlkPhos  102  05-20      LIVER FUNCTIONS - ( 20 May 2019 14:10 )  Alb: 5.1 g/dL / Pro: 7.5 g/dL / ALK PHOS: 102 U/L / ALT: 9 U/L / AST: 14 U/L / GGT: x                                   16.4   7.77  )-----------( 216      ( 20 May 2019 14:10 )             46.9       CAPILLARY BLOOD GLUCOSE
no previous reaction

## 2019-05-28 NOTE — CHART NOTE - NSCHARTNOTEFT_GEN_A_CORE
Social Work Discharge Note:    Patient is cleared for discharge today, May 28, 2019, by Attending Psychiatrist, Dr. Hummel, and Resident Psychiatrist, Dr. Barber.  Patient is alert and oriented x3.  Patient presents with cooperative behavior, reports feeling better, has clearer thought process and good insight/judgment, and is future-oriented.  Patient denies any SI, HI, or AV hallucinations, and reports no acute symptoms.  Patient has been compliant with treatment interventions and psychiatric medication, and will continue to follow up with outpatient behavioral health services.      Patient was provided with psycho-education about his diagnosis, current medications, course of treatment, and follow up appointments.  Clinical team discussed risks, benefits, alternatives discussed, all questions and concerns addressed and answered.  Clinical team reviewed crisis intervention, and patient verbalized understanding.      Patient is aware of discharge plan with continued treatment at [facility], Dual Focus Program, at [address] [place] [address] (tel:[phone], fax: [phone]), with intake appointment scheduled for June 4, 2019 at 12:00 pm with provider Kalie, and agreeable to same.  Patient indicates that he will be returning back to his home residence, and that he will be picked up from [facility] by his cousin Dayami ([phone]).  He states that his phone number will be changing, so this is the number to contact him post-discharge.  Patient indicates that he has received [facility] program information for assistance with housing [place].    Discharge summary will be provided to next level of care by fax at [phone] on this date 5/28/19 at 3:30 pm.    Patient is aware and agreeable to discharge today.

## 2019-06-03 DIAGNOSIS — F63.9 IMPULSE DISORDER, UNSPECIFIED: ICD-10-CM

## 2019-06-03 DIAGNOSIS — K21.9 GASTRO-ESOPHAGEAL REFLUX DISEASE WITHOUT ESOPHAGITIS: ICD-10-CM

## 2019-06-03 DIAGNOSIS — F12.10 CANNABIS ABUSE, UNCOMPLICATED: ICD-10-CM

## 2019-06-03 DIAGNOSIS — F60.89 OTHER SPECIFIC PERSONALITY DISORDERS: ICD-10-CM

## 2019-06-03 DIAGNOSIS — F43.20 ADJUSTMENT DISORDER, UNSPECIFIED: ICD-10-CM

## 2019-06-03 DIAGNOSIS — R45.851 SUICIDAL IDEATIONS: ICD-10-CM

## 2019-06-03 DIAGNOSIS — F39 UNSPECIFIED MOOD [AFFECTIVE] DISORDER: ICD-10-CM

## 2019-06-03 DIAGNOSIS — F31.30 BIPOLAR DISORDER, CURRENT EPISODE DEPRESSED, MILD OR MODERATE SEVERITY, UNSPECIFIED: ICD-10-CM

## 2019-06-10 ENCOUNTER — OUTPATIENT (OUTPATIENT)
Dept: OUTPATIENT SERVICES | Facility: HOSPITAL | Age: 19
LOS: 1 days | Discharge: HOME | End: 2019-06-10

## 2019-06-11 ENCOUNTER — OUTPATIENT (OUTPATIENT)
Dept: OUTPATIENT SERVICES | Facility: HOSPITAL | Age: 19
LOS: 1 days | Discharge: HOME | End: 2019-06-11

## 2019-06-11 DIAGNOSIS — F10.20 ALCOHOL DEPENDENCE, UNCOMPLICATED: ICD-10-CM

## 2019-06-11 PROBLEM — Z78.9 OTHER SPECIFIED HEALTH STATUS: Chronic | Status: ACTIVE | Noted: 2019-05-20

## 2019-07-12 DIAGNOSIS — F10.20 ALCOHOL DEPENDENCE, UNCOMPLICATED: ICD-10-CM

## 2020-05-29 NOTE — PROGRESS NOTE ADULT - PROBLEM SELECTOR PLAN 1
continue present treatment as per psych plan as reviewed  Medically stable with no new changes in treatment  will continue to monitor medical status while being treated on psych Tumor Depth: Less than 6mm from granular layer and no invasion beyond the subcutaneous fat

## 2020-07-03 NOTE — DISCHARGE NOTE BEHAVIORAL HEALTH - NSTOBACCOHOTLINE_GEN_A_NCS
COVID-19 Screening:    Does the patient OR patient’s household members have any of the following symptoms?  • Temperature: Fever >100.4°F or >38.0°C?  No  • Respiratory symptoms: New or worsening cough, shortness of breath, or sore throat? No  • GI symptoms: New onset of nausea, vomiting or diarrhea?  No  • Miscellaneous: New onset of loss of taste or smell, chills, repeated shaking with chills, muscle pain or headache?  No  Has the patient or a household member tested positive or anyone patient may have be around (I.e. coworkers, family, friends) for COVID-19 in the last 14 days?  No    Vistor, mask, and screening policy reviewed with patient.   [facility] Smokers Quitline ([id number])

## 2020-07-18 NOTE — PROGRESS NOTE BEHAVIORAL HEALTH - NSBHFUPINTERVALHXFT_PSY_A_CORE
daily Patient seen and evaluated. Per nursing, patient received prn vistaril for sleep.   He states that he wife has called, but he would prefer to speak with her in person. He reports that she came to visit yesterday, but it was after hours. Writer left  for patient's wife at number provided in chart.   Patient states that he feels good and continues to endorse having good coping skills that include isolating himself, writing disparaging lyrics about those who upset him, and smoking. When asked how he wish he would have handled the situation prior to his hospitalizaiton, he states that he would have smoked some weed. Reporting that he also would have been able to write lyrics after vomiting if the EMTs would not have arrived.   He denies any thoughts of wanting to die or kill himself. Patient seen and evaluated. Per nursing, patient received prn vistaril for sleep.   He states that his wife has called, but he would prefer to speak with her in person. He reports that she came to visit yesterday, but it was after hours. Writer left  for patient's wife at number provided in chart.   Patient states that he feels good and continues to endorse having good coping skills that include isolating himself, writing disparaging lyrics about those who upset him, and smoking. When asked how he wish he would have handled the situation prior to his hospitalizaiton, he states that he would have smoked some weed. Reporting that he also would have been able to write lyrics after vomiting if the EMTs would not have arrived.   He denies any thoughts of wanting to die or kill himself.

## 2021-02-02 NOTE — PATIENT PROFILE BEHAVIORAL HEALTH - PATIENT REPRESENTATIVE: ( YOU CAN CHOOSE ANY PERSON THAT CAN ASSIST YOU WITH YOUR HEALTH CARE PREFERENCES, DOES NOT HAVE TO BE A SPOUSE, IMMEDIATE FAMILY OR SIGNIFICANT OTHER/PARTNER)
-- DO NOT REPLY / DO NOT REPLY ALL --  -- Message is from the [facility]--    COVID-19 Universal Screening: N/A - Not about scheduling    General Patient Message      Reason for Call:  The patient is calling again he would like a call back please     Caller Information       Type Contact Phone    02/02/2021 10:45 AM CST Phone (Incoming) Alfred Wolf (Self) [phone] (M)          Alternative phone [phone]      Turnaround time given to caller:   \"This message will be sent to [state Provider's name]. The clinical team will fulfill your request as soon as they review your message.\"     Declines

## 2021-06-21 NOTE — DISCHARGE NOTE BEHAVIORAL HEALTH - NSTOBACCOWEBSITE_GEN_A_NCS
Anesthesia Pre Eval Note    Anesthesia ROS/Med Hx        Anesthetic Complication History:    No History of difficult airway  No history of malignant hyperthermia    Pulmonary Review:    Negative for COPD History of: no asthma -     Neuro/Psych Review:  Negative for seizures   Negative for neuromuscular disease  Negative for CVA    Cardiovascular Review:  Patient does not have a cardiovascular history   Exercise tolerance: good (>4 METS)  Negative for CHF  Negative for past MI  Negative for CAD  Negative for angina  Negative for orthopnea  Negative for SCHWARTZ/SOB  Negative for hypertension    GI/HEPATIC/RENAL Review:  Patient does not have a GI/hepatic/renalhistory     Negative for GERD  Negative for ulcer disease  Negative for hepatitisNegative for liver disease  Negative for renal disease    End/Other Review:  Negative for diabetes  Negative for hypothyroidism  Negative for hyperthyroidism  Additional Results:     ALLERGIES:  No Known Allergies       Lab Results       Component                Value               Date                       SODIUM                   141                 12/01/2017                 POTASSIUM                4.1                 12/01/2017                 CHLORIDE                 107                 12/01/2017                 CO2                      26                  12/01/2017                 GLUCOSE                  95                  12/01/2017                 BUN                      17                  12/01/2017                 CREATININE               0.98                12/01/2017                 GFRA                     >90                 12/01/2017                 GFRNA                    >90                 12/01/2017                 CALCIUM                  9.2                 12/01/2017             History reviewed. No pertinent past medical history.    Past Surgical History:  No date: Egd       Prior to Admission medications :  Medication Vitamin D, Ergocalciferol, 1.25 mg  ([place] Website --- [email or web address] website --- [email or web address]

## 2022-08-14 NOTE — ED BEHAVIORAL HEALTH ASSESSMENT NOTE - NS ED BHA PLAN INPATIENT UNIT SELECTION YN
Elliot De Leon (: 1959) is a 57 y.o. male, an established patient, is here for evaluation of the following chief complaint(s):  Chief Complaint   Patient presents with    Hypertension                   ASSESSMENT/PLAN:  Jaime was seen today for hypertension. Diagnoses and all orders for this visit:    Essential hypertension, benign  -     olmesartan (BENICAR) 40 MG tablet; Take 1 tablet by mouth in the morning. Dyslipidemia  -     rosuvastatin (CRESTOR) 10 MG tablet; Take 1 tablet by mouth in the morning. TAKE 1 TABLET BY MOUTH EVERY DAY AT NIGHT. Sleep disturbance    BPH with obstruction/lower urinary tract symptoms  -     Armodafinil (NUVIGIL) 150 MG TABS tablet; Take 1 tablet by mouth in the morning for 30 days. 1 po QAM --For Excessive Daytime Somnolence. -     tamsulosin (FLOMAX) 0.4 MG capsule; Take 1 capsule by mouth in the morning. TAKE 1 CAPSULE BY MOUTH EVERY DAY Indications: enlarged prostate with urination problem. Sleep apnea in adult    Alcohol dependence, daily use (HCC)    Moderate major depression (HCC)  -     DULoxetine (CYMBALTA) 60 MG extended release capsule; Take 1 capsule by mouth in the morning. Take w/ 30mg Cymbalta daily, for a total daily dose of 90 mg..    Hyperglycemia    Migraine with aura and without status migrainosus, not intractable  -     topiramate (TOPAMAX) 50 MG tablet; Take 0.5 tablets by mouth daily as needed (tachycardic episodes)    Mild intermittent asthma without complication  -     albuterol sulfate HFA (PROVENTIL;VENTOLIN;PROAIR) 108 (90 Base) MCG/ACT inhaler; Inhale 2 puffs into the lungs every 6 hours as needed for Wheezing INHALE 2 PUFFS BY MOUTH EVERY 4 HOURS AS NEEDED FOR WHEEZE    Screening for colorectal cancer  -     Cornelia Wong MD, Gastroenterology, [place]    Other orders  -     Tdap (ADACEL) 5-2-15.5 LF-MCG/0.5 injection;  Inject 0.5 mLs into the muscle once for 1 dose  -     zoster recombinant adjuvanted vaccine [facility]) 50 MCG/0.5ML SUSR injection; Inject 0.5 mLs into the muscle See Admin Instructions 1 dose now and repeat in 2-6 months    I reviewed recent lab results w/  Mr. Slim Mart. The 10-year ASCVD risk score (Mitra Dorantes, et al., 2013) is: 9.2%    Values used to calculate the score:      Age: 57 years      Sex: Male      Is Non- : No      Diabetic: No      Tobacco smoker: No      Systolic Blood Pressure: 713 mmHg      Is BP treated: Yes      HDL Cholesterol: 53 MG/DL      Total Cholesterol: 136 MG/DL   Prescribed: Crestor 5mg daily. Will increase to 10mg for improved CR Risk reduction. He has hyperglycemia. HgA1C was checked in February and was WNL. He continues to have about 6 alcoholic beverages a day and is encouraged to reduce this to no more than 3. He continues to struggle w/ daytime somnolence. Nuvigil has been helping. Using albuterol for occ. Wheezing/SOB. He continues Flomax for BPH w/ LUTS. He turns 70 in November. Will discuss [address] at next visit. Follow Up    Return for 6 mos HTN/Anxiety/Depression f/u w/ labs prior to visit. SUBJECTIVE/OBJECTIVE:  At his visit On 22, the following was discussed:     He wakes up about 4x/night to use the restroom. On Flomax. Encourage to cut down on beer drinking.  (still drinking ~ 6 beers/day). DEMI--he is playing \"phone-tag\" w/ the sleep center. Plans to schedule an appt w/ them to check settings on his machine. At today's visit:     States that he thinks that Metoprolol seems to be contributing to his daytime fatigue. Has notice some rebound HTN/tachycardia when trying to stop it. Last dose was a 1/2 tab last Saturday and prior to that, he didn't have any of this med for a couple of days prior to that.          Vitals:    08/15/22 0901   BP: 122/68   Site: Right Upper Arm   Position: Sitting   Cuff Size: Medium Adult   Pulse: (!) 106   Resp: 15   Temp: 97.8 °F (36.6 °C)   TempSrc: Oral SpO2: 98%   Weight: 215 lb (97.5 kg)   Height: 5' 11\" (1.803 m)          PHQ-9  8/15/2022 4/13/2022 2/28/2022   Little interest or pleasure in doing things 3 - -   Little interest or pleasure in doing things - 3 3   Feeling down, depressed, or hopeless 3 - -   Trouble falling or staying asleep, or sleeping too much 3 - -   Trouble falling or staying asleep, or sleeping too much - 0 0   Feeling tired or having little energy 2 - -   Feeling tired or having little energy - 3 3   Poor appetite or overeating 0 - -   Poor appetite, weight loss, or overeating - 0 3   Feeling bad about yourself - or that you are a failure or have let yourself or your family down 2 - -   Feeling bad about yourself - or that you are a failure or have let yourself or your family down - 3 3   Trouble concentrating on things, such as reading the newspaper or watching television 2 - -   Trouble concentrating on things such as school, work, reading, or watching TV - 3 3   Moving or speaking so slowly that other people could have noticed. Or the opposite - being so fidgety or restless that you have been moving around a lot more than usual 0 - -   Moving or speaking so slowly that other people could have noticed; or the opposite being so fidgety that others notice - 0 0   Thoughts that you would be better off dead, or of hurting yourself in some way 0 - -   Thoughts of being better off dead, or hurting yourself in some way - 0 0   PHQ-2 Score 6 - -   Total Score PHQ 2 - 3 3   PHQ-9 Total Score 15 - -   PHQ 9 Score - 12 15   If you checked off any problems, how difficult have these problems made it for you to do your work, take care of things at home, or get along with other people?  2 - -   How difficult have these problems made it for you to do your work, take care of your home and get along with others - Very difficult Extremely difficult      PHQ-9 Total Score: 15 (8/15/2022  9:08 AM)  Thoughts that you would be better off dead, or of hurting yourself in some way: Not at all (8/15/2022  9:08 AM)           Orders Placed This Encounter    Mirza Hargrove MD, Gastroenterology, [place]     Referral Priority:   Routine     Referral Type:   Eval and Treat     Referral Reason:   Specialty Services Required     Referred to Provider:   Isaiah Norris MD     Requested Specialty:   Gastroenterology     Number of Visits Requested:   1    topiramate (TOPAMAX) 50 MG tablet     Sig: Take 0.5 tablets by mouth daily as needed (tachycardic episodes)     Dispense:  90 tablet     Refill:  3    rosuvastatin (CRESTOR) 10 MG tablet     Sig: Take 1 tablet by mouth in the morning. TAKE 1 TABLET BY MOUTH EVERY DAY AT NIGHT. Dispense:  90 tablet     Refill:  3     Please d/c any orders on file for Crestor 5mg    albuterol sulfate HFA (PROVENTIL;VENTOLIN;PROAIR) 108 (90 Base) MCG/ACT inhaler     Sig: Inhale 2 puffs into the lungs every 6 hours as needed for Wheezing INHALE 2 PUFFS BY MOUTH EVERY 4 HOURS AS NEEDED FOR WHEEZE     Dispense:  18 g     Refill:  1    Armodafinil (NUVIGIL) 150 MG TABS tablet     Sig: Take 1 tablet by mouth in the morning for 30 days. 1 po QAM --For Excessive Daytime Somnolence. Dispense:  30 tablet     Refill:  5    DULoxetine (CYMBALTA) 60 MG extended release capsule     Sig: Take 1 capsule by mouth in the morning. Take w/ 30mg Cymbalta daily, for a total daily dose of 90 mg..     Dispense:  90 capsule     Refill:  3    olmesartan (BENICAR) 40 MG tablet     Sig: Take 1 tablet by mouth in the morning. Dispense:  90 tablet     Refill:  3    tamsulosin (FLOMAX) 0.4 MG capsule     Sig: Take 1 capsule by mouth in the morning. TAKE 1 CAPSULE BY MOUTH EVERY DAY Indications: enlarged prostate with urination problem.      Dispense:  90 capsule     Refill:  3    Tdap (ADACEL) 5-2-15.5 LF-MCG/0.5 injection     Sig: Inject 0.5 mLs into the muscle once for 1 dose     Dispense:  0.5 mL     Refill:  0    zoster recombinant adjuvanted vaccine [facility]) 50 MCG/0.5ML SUSR injection     Sig: Inject 0.5 mLs into the muscle See Admin Instructions 1 dose now and repeat in 2-6 months     Dispense:  0.5 mL     Refill:  0               An electronic signature was used to authenticate this note.   -- ROSE Jensen No